# Patient Record
Sex: FEMALE | Race: WHITE | NOT HISPANIC OR LATINO | Employment: OTHER | URBAN - METROPOLITAN AREA
[De-identification: names, ages, dates, MRNs, and addresses within clinical notes are randomized per-mention and may not be internally consistent; named-entity substitution may affect disease eponyms.]

---

## 2023-06-30 ENCOUNTER — OFFICE VISIT (OUTPATIENT)
Dept: INTERNAL MEDICINE | Facility: CLINIC | Age: 33
End: 2023-06-30
Payer: COMMERCIAL

## 2023-06-30 VITALS
TEMPERATURE: 98 F | SYSTOLIC BLOOD PRESSURE: 120 MMHG | DIASTOLIC BLOOD PRESSURE: 70 MMHG | BODY MASS INDEX: 36.49 KG/M2 | OXYGEN SATURATION: 98 % | WEIGHT: 227.06 LBS | HEIGHT: 66 IN | HEART RATE: 100 BPM

## 2023-06-30 DIAGNOSIS — Z00.00 WELLNESS EXAMINATION: Primary | ICD-10-CM

## 2023-06-30 DIAGNOSIS — A63.0 HPV (HUMAN PAPILLOMA VIRUS) ANOGENITAL INFECTION: ICD-10-CM

## 2023-06-30 PROCEDURE — 3008F PR BODY MASS INDEX (BMI) DOCUMENTED: ICD-10-PCS | Mod: CPTII,S$GLB,, | Performed by: FAMILY MEDICINE

## 2023-06-30 PROCEDURE — 99999 PR PBB SHADOW E&M-EST. PATIENT-LVL III: ICD-10-PCS | Mod: PBBFAC,,, | Performed by: FAMILY MEDICINE

## 2023-06-30 PROCEDURE — 3078F DIAST BP <80 MM HG: CPT | Mod: CPTII,S$GLB,, | Performed by: FAMILY MEDICINE

## 2023-06-30 PROCEDURE — 3074F SYST BP LT 130 MM HG: CPT | Mod: CPTII,S$GLB,, | Performed by: FAMILY MEDICINE

## 2023-06-30 PROCEDURE — 1160F PR REVIEW ALL MEDS BY PRESCRIBER/CLIN PHARMACIST DOCUMENTED: ICD-10-PCS | Mod: CPTII,S$GLB,, | Performed by: FAMILY MEDICINE

## 2023-06-30 PROCEDURE — 3008F BODY MASS INDEX DOCD: CPT | Mod: CPTII,S$GLB,, | Performed by: FAMILY MEDICINE

## 2023-06-30 PROCEDURE — 1159F MED LIST DOCD IN RCRD: CPT | Mod: CPTII,S$GLB,, | Performed by: FAMILY MEDICINE

## 2023-06-30 PROCEDURE — 1160F RVW MEDS BY RX/DR IN RCRD: CPT | Mod: CPTII,S$GLB,, | Performed by: FAMILY MEDICINE

## 2023-06-30 PROCEDURE — 1159F PR MEDICATION LIST DOCUMENTED IN MEDICAL RECORD: ICD-10-PCS | Mod: CPTII,S$GLB,, | Performed by: FAMILY MEDICINE

## 2023-06-30 PROCEDURE — 99385 PR PREVENTIVE VISIT,NEW,18-39: ICD-10-PCS | Mod: S$GLB,,, | Performed by: FAMILY MEDICINE

## 2023-06-30 PROCEDURE — 3074F PR MOST RECENT SYSTOLIC BLOOD PRESSURE < 130 MM HG: ICD-10-PCS | Mod: CPTII,S$GLB,, | Performed by: FAMILY MEDICINE

## 2023-06-30 PROCEDURE — 99999 PR PBB SHADOW E&M-EST. PATIENT-LVL III: CPT | Mod: PBBFAC,,, | Performed by: FAMILY MEDICINE

## 2023-06-30 PROCEDURE — 99385 PREV VISIT NEW AGE 18-39: CPT | Mod: S$GLB,,, | Performed by: FAMILY MEDICINE

## 2023-06-30 PROCEDURE — 3078F PR MOST RECENT DIASTOLIC BLOOD PRESSURE < 80 MM HG: ICD-10-PCS | Mod: CPTII,S$GLB,, | Performed by: FAMILY MEDICINE

## 2023-06-30 RX ORDER — SERTRALINE HYDROCHLORIDE 100 MG/1
100 TABLET, FILM COATED ORAL 2 TIMES DAILY
COMMUNITY
Start: 2023-06-21

## 2023-06-30 NOTE — PROGRESS NOTES
Subjective:       Patient ID: Lanie Arce is a 32 y.o. female.    Chief Complaint: Establish Care    Lanie Arce is a 32 y.o. female and is here for a comprehensive physical exam.    Do you take any herbs or supplements that were not prescribed by a doctor? no  Are you taking calcium supplements? no  Are you taking aspirin daily? no     History:  Any STD's in the past? hpv    The following portions of the patient's history were reviewed and updated as appropriate: allergies, current medications, past family history, past medical history, past social history, past surgical history and problem list.    Review of Systems  Do you have pain that bothers you in your daily life? no  Pertinent items are noted in HPI.      2. Patient Counseling:  --Nutrition: Stressed importance of moderation in sodium/caffeine intake, saturated fat and cholesterol, caloric balance.  --Exercise: Stressed the importance of regular exercise.   --Substance Abuse: Discussed cessation/primary prevention of tobacco, alcohol - etoh occ.   --Sexuality: Discussed sexually transmitted disease.  --Injury prevention: Discussed safety belts, smoke detector.   --Dental health: Discussed dental health.  --Immunizations reviewed.    3. Discussed the patient's BMI.  4. Follow up as needed for acute illness        Review of Systems   Constitutional:  Negative for fever.   HENT:  Negative for congestion.    Eyes:  Negative for discharge.   Respiratory:  Negative for shortness of breath.    Cardiovascular:  Negative for chest pain.   Gastrointestinal:  Negative for abdominal pain.   Genitourinary:  Negative for difficulty urinating.   Musculoskeletal:  Negative for joint swelling.   Neurological:  Negative for dizziness.   Psychiatric/Behavioral:  Negative for agitation.      Objective:      Physical Exam  Vitals and nursing note reviewed.   Constitutional:       General: She is not in acute distress.     Appearance: Normal appearance. She is  well-developed. She is not diaphoretic.   HENT:      Head: Normocephalic and atraumatic.   Eyes:      General: No scleral icterus.     Conjunctiva/sclera: Conjunctivae normal.   Cardiovascular:      Rate and Rhythm: Normal rate and regular rhythm.   Pulmonary:      Effort: Pulmonary effort is normal. No respiratory distress.      Breath sounds: Normal breath sounds. No wheezing.   Abdominal:      General: There is no distension.      Palpations: Abdomen is soft.      Tenderness: There is no abdominal tenderness. There is no guarding.   Skin:     General: Skin is warm.      Coloration: Skin is not pale.      Findings: No erythema or rash.      Comments: Good turgor   Neurological:      Mental Status: She is alert.   Psychiatric:         Mood and Affect: Mood normal.         Thought Content: Thought content normal.       Assessment:       1. Wellness examination    2. Mother currently breast-feeding    3. HPV (human papilloma virus) anogenital infection        Plan:     Problem List Items Addressed This Visit          Renal/    Mother currently breast-feeding       ID    HPV (human papilloma virus) anogenital infection       Other    Wellness examination - Primary    Relevant Orders    CBC Auto Differential    Comprehensive Metabolic Panel    Hemoglobin A1C    Ferritin    Iron and TIBC    Hepatitis C Antibody    HIV 1/2 Ag/Ab (4th Gen)    Lipid Panel    TSH    T4, FREE

## 2023-07-03 ENCOUNTER — LAB VISIT (OUTPATIENT)
Dept: LAB | Facility: HOSPITAL | Age: 33
End: 2023-07-03
Attending: FAMILY MEDICINE
Payer: COMMERCIAL

## 2023-07-03 DIAGNOSIS — Z00.00 WELLNESS EXAMINATION: ICD-10-CM

## 2023-07-03 LAB
ALBUMIN SERPL BCP-MCNC: 4 G/DL (ref 3.5–5.2)
ALP SERPL-CCNC: 135 U/L (ref 55–135)
ALT SERPL W/O P-5'-P-CCNC: 26 U/L (ref 10–44)
ANION GAP SERPL CALC-SCNC: 13 MMOL/L (ref 8–16)
AST SERPL-CCNC: 20 U/L (ref 10–40)
BASOPHILS # BLD AUTO: 0.06 K/UL (ref 0–0.2)
BASOPHILS NFR BLD: 0.9 % (ref 0–1.9)
BILIRUB SERPL-MCNC: 0.3 MG/DL (ref 0.1–1)
BUN SERPL-MCNC: 14 MG/DL (ref 6–20)
CALCIUM SERPL-MCNC: 9.9 MG/DL (ref 8.7–10.5)
CHLORIDE SERPL-SCNC: 106 MMOL/L (ref 95–110)
CHOLEST SERPL-MCNC: 229 MG/DL (ref 120–199)
CHOLEST/HDLC SERPL: 3.8 {RATIO} (ref 2–5)
CO2 SERPL-SCNC: 24 MMOL/L (ref 23–29)
CREAT SERPL-MCNC: 0.8 MG/DL (ref 0.5–1.4)
DIFFERENTIAL METHOD: ABNORMAL
EOSINOPHIL # BLD AUTO: 0.4 K/UL (ref 0–0.5)
EOSINOPHIL NFR BLD: 5.5 % (ref 0–8)
ERYTHROCYTE [DISTWIDTH] IN BLOOD BY AUTOMATED COUNT: 15.1 % (ref 11.5–14.5)
EST. GFR  (NO RACE VARIABLE): >60 ML/MIN/1.73 M^2
ESTIMATED AVG GLUCOSE: 108 MG/DL (ref 68–131)
FERRITIN SERPL-MCNC: 84 NG/ML (ref 20–300)
GLUCOSE SERPL-MCNC: 77 MG/DL (ref 70–110)
HBA1C MFR BLD: 5.4 % (ref 4–5.6)
HCT VFR BLD AUTO: 41.1 % (ref 37–48.5)
HCV AB SERPL QL IA: NORMAL
HDLC SERPL-MCNC: 61 MG/DL (ref 40–75)
HDLC SERPL: 26.6 % (ref 20–50)
HGB BLD-MCNC: 12.8 G/DL (ref 12–16)
HIV 1+2 AB+HIV1 P24 AG SERPL QL IA: NORMAL
IMM GRANULOCYTES # BLD AUTO: 0.01 K/UL (ref 0–0.04)
IMM GRANULOCYTES NFR BLD AUTO: 0.2 % (ref 0–0.5)
IRON SERPL-MCNC: 44 UG/DL (ref 30–160)
LDLC SERPL CALC-MCNC: 145.8 MG/DL (ref 63–159)
LYMPHOCYTES # BLD AUTO: 2.1 K/UL (ref 1–4.8)
LYMPHOCYTES NFR BLD: 33.5 % (ref 18–48)
MCH RBC QN AUTO: 26.4 PG (ref 27–31)
MCHC RBC AUTO-ENTMCNC: 31.1 G/DL (ref 32–36)
MCV RBC AUTO: 85 FL (ref 82–98)
MONOCYTES # BLD AUTO: 0.5 K/UL (ref 0.3–1)
MONOCYTES NFR BLD: 7.4 % (ref 4–15)
NEUTROPHILS # BLD AUTO: 3.3 K/UL (ref 1.8–7.7)
NEUTROPHILS NFR BLD: 52.5 % (ref 38–73)
NONHDLC SERPL-MCNC: 168 MG/DL
NRBC BLD-RTO: 0 /100 WBC
PLATELET # BLD AUTO: 253 K/UL (ref 150–450)
PMV BLD AUTO: 12.1 FL (ref 9.2–12.9)
POTASSIUM SERPL-SCNC: 5 MMOL/L (ref 3.5–5.1)
PROT SERPL-MCNC: 7.9 G/DL (ref 6–8.4)
RBC # BLD AUTO: 4.85 M/UL (ref 4–5.4)
SATURATED IRON: 11 % (ref 20–50)
SODIUM SERPL-SCNC: 143 MMOL/L (ref 136–145)
T4 FREE SERPL-MCNC: 0.81 NG/DL (ref 0.71–1.51)
TOTAL IRON BINDING CAPACITY: 395 UG/DL (ref 250–450)
TRANSFERRIN SERPL-MCNC: 267 MG/DL (ref 200–375)
TRIGL SERPL-MCNC: 111 MG/DL (ref 30–150)
TSH SERPL DL<=0.005 MIU/L-ACNC: 0.66 UIU/ML (ref 0.4–4)
WBC # BLD AUTO: 6.36 K/UL (ref 3.9–12.7)

## 2023-07-03 PROCEDURE — 36415 COLL VENOUS BLD VENIPUNCTURE: CPT | Mod: PO | Performed by: FAMILY MEDICINE

## 2023-07-03 PROCEDURE — 82728 ASSAY OF FERRITIN: CPT | Performed by: FAMILY MEDICINE

## 2023-07-03 PROCEDURE — 86803 HEPATITIS C AB TEST: CPT | Performed by: FAMILY MEDICINE

## 2023-07-03 PROCEDURE — 80061 LIPID PANEL: CPT | Performed by: FAMILY MEDICINE

## 2023-07-03 PROCEDURE — 80053 COMPREHEN METABOLIC PANEL: CPT | Performed by: FAMILY MEDICINE

## 2023-07-03 PROCEDURE — 85025 COMPLETE CBC W/AUTO DIFF WBC: CPT | Performed by: FAMILY MEDICINE

## 2023-07-03 PROCEDURE — 87389 HIV-1 AG W/HIV-1&-2 AB AG IA: CPT | Performed by: FAMILY MEDICINE

## 2023-07-03 PROCEDURE — 83036 HEMOGLOBIN GLYCOSYLATED A1C: CPT | Performed by: FAMILY MEDICINE

## 2023-07-03 PROCEDURE — 84443 ASSAY THYROID STIM HORMONE: CPT | Performed by: FAMILY MEDICINE

## 2023-07-03 PROCEDURE — 84439 ASSAY OF FREE THYROXINE: CPT | Performed by: FAMILY MEDICINE

## 2023-07-03 PROCEDURE — 84466 ASSAY OF TRANSFERRIN: CPT | Performed by: FAMILY MEDICINE

## 2023-07-05 ENCOUNTER — PATIENT MESSAGE (OUTPATIENT)
Dept: INTERNAL MEDICINE | Facility: CLINIC | Age: 33
End: 2023-07-05
Payer: COMMERCIAL

## 2023-07-06 ENCOUNTER — OFFICE VISIT (OUTPATIENT)
Dept: INTERNAL MEDICINE | Facility: CLINIC | Age: 33
End: 2023-07-06
Payer: COMMERCIAL

## 2023-07-06 VITALS
SYSTOLIC BLOOD PRESSURE: 130 MMHG | TEMPERATURE: 98 F | DIASTOLIC BLOOD PRESSURE: 74 MMHG | OXYGEN SATURATION: 95 % | BODY MASS INDEX: 36.74 KG/M2 | HEIGHT: 66 IN | WEIGHT: 228.63 LBS | HEART RATE: 104 BPM

## 2023-07-06 DIAGNOSIS — H66.92 ACUTE OTITIS MEDIA, LEFT: Primary | ICD-10-CM

## 2023-07-06 PROCEDURE — 99999 PR PBB SHADOW E&M-EST. PATIENT-LVL IV: CPT | Mod: PBBFAC,,, | Performed by: NURSE PRACTITIONER

## 2023-07-06 PROCEDURE — 3078F PR MOST RECENT DIASTOLIC BLOOD PRESSURE < 80 MM HG: ICD-10-PCS | Mod: CPTII,S$GLB,, | Performed by: NURSE PRACTITIONER

## 2023-07-06 PROCEDURE — 3075F PR MOST RECENT SYSTOLIC BLOOD PRESS GE 130-139MM HG: ICD-10-PCS | Mod: CPTII,S$GLB,, | Performed by: NURSE PRACTITIONER

## 2023-07-06 PROCEDURE — 1159F PR MEDICATION LIST DOCUMENTED IN MEDICAL RECORD: ICD-10-PCS | Mod: CPTII,S$GLB,, | Performed by: NURSE PRACTITIONER

## 2023-07-06 PROCEDURE — 99214 OFFICE O/P EST MOD 30 MIN: CPT | Mod: S$GLB,,, | Performed by: NURSE PRACTITIONER

## 2023-07-06 PROCEDURE — 3008F PR BODY MASS INDEX (BMI) DOCUMENTED: ICD-10-PCS | Mod: CPTII,S$GLB,, | Performed by: NURSE PRACTITIONER

## 2023-07-06 PROCEDURE — 99999 PR PBB SHADOW E&M-EST. PATIENT-LVL IV: ICD-10-PCS | Mod: PBBFAC,,, | Performed by: NURSE PRACTITIONER

## 2023-07-06 PROCEDURE — 99214 PR OFFICE/OUTPT VISIT, EST, LEVL IV, 30-39 MIN: ICD-10-PCS | Mod: S$GLB,,, | Performed by: NURSE PRACTITIONER

## 2023-07-06 PROCEDURE — 3075F SYST BP GE 130 - 139MM HG: CPT | Mod: CPTII,S$GLB,, | Performed by: NURSE PRACTITIONER

## 2023-07-06 PROCEDURE — 3044F PR MOST RECENT HEMOGLOBIN A1C LEVEL <7.0%: ICD-10-PCS | Mod: CPTII,S$GLB,, | Performed by: NURSE PRACTITIONER

## 2023-07-06 PROCEDURE — 1160F RVW MEDS BY RX/DR IN RCRD: CPT | Mod: CPTII,S$GLB,, | Performed by: NURSE PRACTITIONER

## 2023-07-06 PROCEDURE — 1160F PR REVIEW ALL MEDS BY PRESCRIBER/CLIN PHARMACIST DOCUMENTED: ICD-10-PCS | Mod: CPTII,S$GLB,, | Performed by: NURSE PRACTITIONER

## 2023-07-06 PROCEDURE — 3078F DIAST BP <80 MM HG: CPT | Mod: CPTII,S$GLB,, | Performed by: NURSE PRACTITIONER

## 2023-07-06 PROCEDURE — 1159F MED LIST DOCD IN RCRD: CPT | Mod: CPTII,S$GLB,, | Performed by: NURSE PRACTITIONER

## 2023-07-06 PROCEDURE — 3044F HG A1C LEVEL LT 7.0%: CPT | Mod: CPTII,S$GLB,, | Performed by: NURSE PRACTITIONER

## 2023-07-06 PROCEDURE — 3008F BODY MASS INDEX DOCD: CPT | Mod: CPTII,S$GLB,, | Performed by: NURSE PRACTITIONER

## 2023-07-06 RX ORDER — AMOXICILLIN 875 MG/1
875 TABLET, FILM COATED ORAL EVERY 12 HOURS
Qty: 20 TABLET | Refills: 0 | Status: SHIPPED | OUTPATIENT
Start: 2023-07-06 | End: 2023-07-16

## 2023-07-06 RX ORDER — FLUTICASONE PROPIONATE 50 MCG
1 SPRAY, SUSPENSION (ML) NASAL DAILY
Qty: 18.2 ML | Refills: 0 | Status: SHIPPED | OUTPATIENT
Start: 2023-07-06 | End: 2023-08-02

## 2023-07-06 NOTE — PROGRESS NOTES
"Subjective:       Patient ID: Laine Arce is a 32 y.o. female.    Chief Complaint: Otalgia (Started last night/No pain )    Pt presents to clinic today for left ear pain  Started last night with some ear pain  Congestion, sinus pressure  No fever  Child with like illness        /74   Pulse 104   Temp 97.7 °F (36.5 °C) (Tympanic)   Ht 5' 6" (1.676 m)   Wt 103.7 kg (228 lb 9.9 oz)   LMP  (LMP Unknown)   SpO2 95%   BMI 36.90 kg/m²     Review of Systems   Constitutional:  Negative for activity change, appetite change, chills, diaphoresis, fatigue, fever and unexpected weight change.   HENT:  Positive for ear pain, rhinorrhea, sinus pressure and sinus pain. Negative for trouble swallowing.    Respiratory:  Negative for cough and shortness of breath.    Cardiovascular:  Negative for chest pain, palpitations and leg swelling.   Gastrointestinal: Negative.    Genitourinary: Negative.    Musculoskeletal: Negative.    Skin:  Negative for color change, pallor, rash and wound.   Allergic/Immunologic: Negative for immunocompromised state.   Neurological: Negative.  Negative for dizziness and facial asymmetry.   Hematological:  Negative for adenopathy. Does not bruise/bleed easily.   Psychiatric/Behavioral:  Negative for agitation, behavioral problems and confusion.      Objective:      Physical Exam  Vitals and nursing note reviewed.   Constitutional:       Appearance: She is well-developed. She is not diaphoretic.   HENT:      Head: Normocephalic and atraumatic.      Right Ear: Tympanic membrane, ear canal and external ear normal.      Left Ear: Ear canal and external ear normal. Tympanic membrane is erythematous and bulging.      Nose: Mucosal edema and rhinorrhea present.      Right Sinus: No maxillary sinus tenderness or frontal sinus tenderness.      Left Sinus: No maxillary sinus tenderness or frontal sinus tenderness.      Mouth/Throat:      Pharynx: Uvula midline. No oropharyngeal exudate or posterior " oropharyngeal erythema.   Eyes:      General:         Right eye: No discharge.         Left eye: No discharge.      Conjunctiva/sclera: Conjunctivae normal.   Cardiovascular:      Rate and Rhythm: Normal rate and regular rhythm.      Heart sounds: Normal heart sounds. No murmur heard.  Pulmonary:      Effort: Pulmonary effort is normal. No accessory muscle usage or respiratory distress.      Breath sounds: Normal breath sounds. No decreased breath sounds, wheezing, rhonchi or rales.   Chest:      Chest wall: No tenderness.   Abdominal:      General: There is no distension.      Palpations: Abdomen is soft.   Musculoskeletal:         General: Normal range of motion.      Cervical back: Normal range of motion.   Skin:     General: Skin is warm and dry.   Neurological:      Mental Status: She is alert and oriented to person, place, and time.   Psychiatric:         Behavior: Behavior normal.       Assessment:       1. Acute otitis media, left    2. BMI 36.0-36.9,adult        Plan:       Lanie was seen today for otalgia.    Diagnoses and all orders for this visit:    Acute otitis media, left  -     amoxicillin (AMOXIL) 875 MG tablet; Take 1 tablet (875 mg total) by mouth every 12 (twelve) hours. for 10 days    BMI 36.0-36.9,adult    Other orders  -     fluticasone propionate (FLONASE) 50 mcg/actuation nasal spray; 1 spray (50 mcg total) by Each Nostril route once daily.      Complete all meds as prescribed  Keep ear clean and dry  Follow up for worsening or no improvement in symptoms and PRN.

## 2023-08-02 RX ORDER — FLUTICASONE PROPIONATE 50 MCG
SPRAY, SUSPENSION (ML) NASAL
Qty: 16 ML | Refills: 0 | Status: SHIPPED | OUTPATIENT
Start: 2023-08-02

## 2023-10-02 PROBLEM — Z00.00 WELLNESS EXAMINATION: Status: RESOLVED | Noted: 2023-06-30 | Resolved: 2023-10-02

## 2023-12-04 ENCOUNTER — OFFICE VISIT (OUTPATIENT)
Dept: INTERNAL MEDICINE | Facility: CLINIC | Age: 33
End: 2023-12-04
Payer: COMMERCIAL

## 2023-12-04 VITALS
BODY MASS INDEX: 37.14 KG/M2 | HEIGHT: 66 IN | OXYGEN SATURATION: 96 % | WEIGHT: 231.06 LBS | HEART RATE: 99 BPM | DIASTOLIC BLOOD PRESSURE: 70 MMHG | TEMPERATURE: 97 F | SYSTOLIC BLOOD PRESSURE: 114 MMHG

## 2023-12-04 DIAGNOSIS — R63.5 WEIGHT GAIN: Primary | ICD-10-CM

## 2023-12-04 DIAGNOSIS — E66.9 CLASS 2 OBESITY WITH BODY MASS INDEX (BMI) OF 37.0 TO 37.9 IN ADULT, UNSPECIFIED OBESITY TYPE, UNSPECIFIED WHETHER SERIOUS COMORBIDITY PRESENT: ICD-10-CM

## 2023-12-04 PROCEDURE — 3044F HG A1C LEVEL LT 7.0%: CPT | Mod: CPTII,S$GLB,, | Performed by: NURSE PRACTITIONER

## 2023-12-04 PROCEDURE — 99999 PR PBB SHADOW E&M-EST. PATIENT-LVL III: CPT | Mod: PBBFAC,,, | Performed by: NURSE PRACTITIONER

## 2023-12-04 PROCEDURE — 3078F DIAST BP <80 MM HG: CPT | Mod: CPTII,S$GLB,, | Performed by: NURSE PRACTITIONER

## 2023-12-04 PROCEDURE — 99999 PR PBB SHADOW E&M-EST. PATIENT-LVL III: ICD-10-PCS | Mod: PBBFAC,,, | Performed by: NURSE PRACTITIONER

## 2023-12-04 PROCEDURE — 1160F RVW MEDS BY RX/DR IN RCRD: CPT | Mod: CPTII,S$GLB,, | Performed by: NURSE PRACTITIONER

## 2023-12-04 PROCEDURE — 99213 PR OFFICE/OUTPT VISIT, EST, LEVL III, 20-29 MIN: ICD-10-PCS | Mod: S$GLB,,, | Performed by: NURSE PRACTITIONER

## 2023-12-04 PROCEDURE — 1159F MED LIST DOCD IN RCRD: CPT | Mod: CPTII,S$GLB,, | Performed by: NURSE PRACTITIONER

## 2023-12-04 PROCEDURE — 1160F PR REVIEW ALL MEDS BY PRESCRIBER/CLIN PHARMACIST DOCUMENTED: ICD-10-PCS | Mod: CPTII,S$GLB,, | Performed by: NURSE PRACTITIONER

## 2023-12-04 PROCEDURE — 3008F PR BODY MASS INDEX (BMI) DOCUMENTED: ICD-10-PCS | Mod: CPTII,S$GLB,, | Performed by: NURSE PRACTITIONER

## 2023-12-04 PROCEDURE — 99213 OFFICE O/P EST LOW 20 MIN: CPT | Mod: S$GLB,,, | Performed by: NURSE PRACTITIONER

## 2023-12-04 PROCEDURE — 1159F PR MEDICATION LIST DOCUMENTED IN MEDICAL RECORD: ICD-10-PCS | Mod: CPTII,S$GLB,, | Performed by: NURSE PRACTITIONER

## 2023-12-04 PROCEDURE — 3074F PR MOST RECENT SYSTOLIC BLOOD PRESSURE < 130 MM HG: ICD-10-PCS | Mod: CPTII,S$GLB,, | Performed by: NURSE PRACTITIONER

## 2023-12-04 PROCEDURE — 3074F SYST BP LT 130 MM HG: CPT | Mod: CPTII,S$GLB,, | Performed by: NURSE PRACTITIONER

## 2023-12-04 PROCEDURE — 3078F PR MOST RECENT DIASTOLIC BLOOD PRESSURE < 80 MM HG: ICD-10-PCS | Mod: CPTII,S$GLB,, | Performed by: NURSE PRACTITIONER

## 2023-12-04 PROCEDURE — 3044F PR MOST RECENT HEMOGLOBIN A1C LEVEL <7.0%: ICD-10-PCS | Mod: CPTII,S$GLB,, | Performed by: NURSE PRACTITIONER

## 2023-12-04 PROCEDURE — 3008F BODY MASS INDEX DOCD: CPT | Mod: CPTII,S$GLB,, | Performed by: NURSE PRACTITIONER

## 2023-12-04 NOTE — PROGRESS NOTES
"Subjective:       Patient ID: Lanie Arce is a 32 y.o. female.    Chief Complaint: Obesity    Pt presents to clinic today for concerns over her weight   Feels like she is struggling to lose the weight after her 4 kids  She is currently weaning from breast feeding   Highest adult weight- 230 lbs  Lowest adult weight- 185 lbs  Goal to be 185 lbs    She has been trying to get in some exercise 3-4 times per week  Admits her eating habits are not quite where they should be   Breakfast- egg sandwich  Lunch- sandwich, leftovers  Dinners- pastas, rice/gravy   Drinks lots of water         /70   Pulse 99   Temp 97 °F (36.1 °C) (Tympanic)   Ht 5' 6" (1.676 m)   Wt 104.8 kg (231 lb 0.7 oz)   LMP 11/27/2023 (Approximate)   SpO2 96%   BMI 37.29 kg/m²     Review of Systems   Constitutional:  Positive for unexpected weight change. Negative for activity change, appetite change, chills, diaphoresis, fatigue and fever.   HENT: Negative.     Respiratory:  Negative for cough and shortness of breath.    Cardiovascular:  Negative for chest pain, palpitations and leg swelling.   Gastrointestinal: Negative.    Genitourinary: Negative.    Musculoskeletal: Negative.    Skin:  Negative for color change, pallor, rash and wound.   Allergic/Immunologic: Negative for immunocompromised state.   Neurological: Negative.  Negative for dizziness and facial asymmetry.   Hematological:  Negative for adenopathy. Does not bruise/bleed easily.   Psychiatric/Behavioral:  Negative for agitation, behavioral problems and confusion.        Objective:      Physical Exam  Vitals and nursing note reviewed.   Constitutional:       General: She is not in acute distress.     Appearance: Normal appearance. She is well-developed. She is obese. She is not diaphoretic.   HENT:      Head: Normocephalic and atraumatic.   Cardiovascular:      Rate and Rhythm: Normal rate and regular rhythm.      Heart sounds: Normal heart sounds. No murmur heard.  Pulmonary:    "   Effort: Pulmonary effort is normal. No respiratory distress.      Breath sounds: Normal breath sounds.   Musculoskeletal:         General: Normal range of motion.   Skin:     General: Skin is warm and dry.      Findings: No rash.   Neurological:      Mental Status: She is alert.   Psychiatric:         Mood and Affect: Mood normal.         Behavior: Behavior normal. Behavior is cooperative.         Thought Content: Thought content normal.         Judgment: Judgment normal.         Assessment:       1. Weight gain    2. Class 2 obesity with body mass index (BMI) of 37.0 to 37.9 in adult, unspecified obesity type, unspecified whether serious comorbidity present        Plan:       Lanie was seen today for obesity.    Diagnoses and all orders for this visit:    Weight gain    Class 2 obesity with body mass index (BMI) of 37.0 to 37.9 in adult, unspecified obesity type, unspecified whether serious comorbidity present      We discussed her 24 food diary recall  Showed her some ways to make improvements in her diet  Focus on protein- aim for about  grams protein/day  Decrease carbs   Drink lots of water  We did discuss glp1 meds however with insurance being an issue- this is likely not an option  Follow up for worsening or no improvement in symptoms and PRN.

## 2024-02-28 ENCOUNTER — PATIENT MESSAGE (OUTPATIENT)
Dept: URGENT CARE | Facility: CLINIC | Age: 34
End: 2024-02-28
Payer: COMMERCIAL

## 2024-04-01 ENCOUNTER — NURSE TRIAGE (OUTPATIENT)
Dept: ADMINISTRATIVE | Facility: CLINIC | Age: 34
End: 2024-04-01
Payer: COMMERCIAL

## 2024-04-01 NOTE — TELEPHONE ENCOUNTER
OOC Rn  Patient calling about stepping on a justine nail.   It is pounding.   Left foot on the ball. Denies bleeding.  Last tetnanus,   got Dtap during last preg in 2022.    Patient boiled perioxde and washed wound.   Wound still look dirty, black,   size of nail. Care advise is to go to ED/UC now, for any new or worsening symptoms to callback.  Number given and paitent VU.   Reason for Disposition   Dirt in the wound and not removed with 15 minutes of scrubbing    Additional Information   Negative: Shock suspected (e.g., cold/pale/clammy skin, too weak to stand, low BP, rapid pulse)   Negative: Puncture wound of head, neck, chest, back, or abdomen that sounds life-threatening to triager   Negative: Sounds like a life-threatening emergency to the triager   Negative: Puncture wound of head, neck, chest, abdomen, or overlying a joint and it could be deep   Negative: Needlestick from used or discarded injection needle  (Exception: Clean, unused needle.)   Negative: High pressure injection injury (e.g., from grease gun or paint gun, usually work-related)    Protocols used: Puncture Wound-A-OH

## 2024-05-14 ENCOUNTER — NURSE TRIAGE (OUTPATIENT)
Dept: ADMINISTRATIVE | Facility: CLINIC | Age: 34
End: 2024-05-14
Payer: COMMERCIAL

## 2024-05-14 NOTE — TELEPHONE ENCOUNTER
Lanie reports dog bite on May 5th. Seen by  provider on 5/5/24. Did not complete antibiotic that was prescribed, only took med for about 2 days. Pt reports tender knot beneath left forearm puncture wounds noticed 2 days ago. Advised pt per triage protocol to see in office today. Appt scheduled via VV for 1215 today per pt request. V/u.       Reason for Disposition   Patient wants to be seen    Additional Information   Negative: Major bleeding (actively dripping or spurting) that can't be stopped   Negative: Sounds like a life-threatening emergency to the triager   Negative: Any break in skin from BITE (e.g., cut, puncture, or scratch) and WILD animal at risk for RABIES (e.g., bat, raccoon, baez, skunk, coyote, other carnivores; see Background for list)   Negative: Any break in skin from BITE (e.g., cut, puncture, or scratch) and PET animal (e.g., dog, cat, or ferret) at risk for RABIES (e.g., sick, stray, unprovoked bite, developing country)   Negative: Any break in skin from BITE (e.g., cut, puncture, or scratch) and monkey   Negative: Cut (length > 1/8 inch or 3 mm) or skin tear and any animal  (Exception: Superficial scratch that doesn't go through dermis.)   Negative: Bleeding won't stop after 10 minutes of direct pressure (using correct technique)   Negative: EXPOSURE of non-intact skin (e.g., exposed person has dermatitis, abrasion, wound)  with animal BODY FLUID (e.g., saliva such as licking, blood, brain) and animal at high-risk for RABIES (e.g., bat, raccoon, baez, skunk, coyote, other carnivores)   Negative: Sounds like a serious bite injury to the triager   Negative: SEVERE pain (e.g., excruciating)   Negative: Puncture wound (hole through the skin) from cat (teeth or claws)   Negative: Puncture wound or small cut on face  (Exception: Puncture from small pet, such as a gerbil, mouse, hamster, puppy; or shallow scratches.)   Negative: Puncture wound or small cut on hands or genitals  (Exception: Puncture  from small pet, such as a gerbil, mouse, hamster, puppy; or shallow scratches.)   Negative: Puncture wound and weak immune system (e.g., HIV positive, cancer chemo, splenectomy, organ transplant, chronic steroids)   Negative: Looks infected (spreading redness, red streak, or pus)   Negative: No bite robert and suspected bat exposure (e.g., bat found in same room as sleeping adult)   Negative: No prior tetanus shots (or is not fully vaccinated) and any wound (e.g., cut, scrape)   Negative: Last tetanus shot > 5 years and any wound (e.g., cut, scrape)    Protocols used: Animal Bite-A-OH

## 2024-11-13 ENCOUNTER — HOSPITAL ENCOUNTER (OUTPATIENT)
Dept: RADIOLOGY | Facility: HOSPITAL | Age: 34
Discharge: HOME | End: 2024-11-13
Payer: COMMERCIAL

## 2024-11-13 ENCOUNTER — OFFICE VISIT (OUTPATIENT)
Dept: INTERNAL MEDICINE | Facility: CLINIC | Age: 34
End: 2024-11-13
Payer: COMMERCIAL

## 2024-11-13 ENCOUNTER — PATIENT MESSAGE (OUTPATIENT)
Dept: INTERNAL MEDICINE | Facility: CLINIC | Age: 34
End: 2024-11-13

## 2024-11-13 VITALS
SYSTOLIC BLOOD PRESSURE: 116 MMHG | DIASTOLIC BLOOD PRESSURE: 76 MMHG | RESPIRATION RATE: 15 BRPM | WEIGHT: 179 LBS | TEMPERATURE: 96 F | BODY MASS INDEX: 28.77 KG/M2 | HEART RATE: 95 BPM | HEIGHT: 66 IN | OXYGEN SATURATION: 95 %

## 2024-11-13 DIAGNOSIS — Z01.818 PRE-OP EXAM: Primary | ICD-10-CM

## 2024-11-13 DIAGNOSIS — Z01.818 PRE-OP EXAM: ICD-10-CM

## 2024-11-13 DIAGNOSIS — D68.61 ANTIPHOSPHOLIPID SYNDROME: ICD-10-CM

## 2024-11-13 LAB
OHS QRS DURATION: 80 MS
OHS QTC CALCULATION: 424 MS

## 2024-11-13 PROCEDURE — 71046 X-RAY EXAM CHEST 2 VIEWS: CPT | Mod: 26,,, | Performed by: RADIOLOGY

## 2024-11-13 PROCEDURE — 99999 PR PBB SHADOW E&M-EST. PATIENT-LVL IV: CPT | Mod: PBBFAC,,,

## 2024-11-13 PROCEDURE — 71046 X-RAY EXAM CHEST 2 VIEWS: CPT | Mod: TC,FY,PO

## 2024-11-13 PROCEDURE — 93010 ELECTROCARDIOGRAM REPORT: CPT | Mod: S$GLB,,, | Performed by: INTERNAL MEDICINE

## 2024-11-13 RX ORDER — SERTRALINE HYDROCHLORIDE 200 MG/1
200 CAPSULE ORAL DAILY
COMMUNITY

## 2024-11-13 RX ORDER — FAMOTIDINE 20 MG/1
20 TABLET, FILM COATED ORAL 2 TIMES DAILY PRN
COMMUNITY
Start: 2024-07-25 | End: 2025-07-25

## 2024-11-13 RX ORDER — TIRZEPATIDE 7.5 MG/.5ML
6.5 INJECTION, SOLUTION SUBCUTANEOUS
COMMUNITY
Start: 2024-11-08 | End: 2024-12-08

## 2024-11-13 NOTE — PROGRESS NOTES
Pre  Subjective:     Lanie Arce is a 33 y.o. female who presents to the office today for a preoperative consultation at the request of surgeon Dr. Lerma who plans on performing left SI fusion on December 6. This consultation is requested for the specific conditions prompting preoperative evaluation (i.e. because of potential affect on operative risk). Planned anesthesia: general. The patient has the following known anesthesia issues:  none . Patients bleeding risk: no recent abnormal bleeding, no remote history of abnormal bleeding, and no use of Ca-channel blockers. Patient does not have objections to receiving blood products if needed.    HPI:  Patient is scheduled for a left SI fusion surgery with Dr. Lerma at [facility]. She previously underwent an L5-S1 fusion, which provided effective pain relief despite a challenging recovery. Patient reports a fall down stairs following her previous back surgery, potentially contributing to her current SI joint issues. She expresses optimism about the upcoming surgery, anticipating improvement in her symptoms.    Patient discusses a diagnosis of antiphospholipid syndrome, discovered during a life insurance application process. She reports a past evaluation by an immunologist but is unable to recall specific details. She expresses uncertainty regarding the need for retesting or treatment and reports generalized aches, unsure if they are related to this condition or within normal limits. Patient denies complications during childbirth, has four children and has had four deliveries (P4).      Patient also denies chest pain, palpitations, shortness of breath, swelling, severe headaches, visual disturbances, and constipation, diarrhea, or urination difficulties.        The following portions of the patient's history were reviewed and updated as appropriate:  Past Medical History:   Diagnosis Date    Back pain with history of spinal surgery     Depression     GERD (gastroesophageal  reflux disease)     Heart murmur     Pars defect of lumbar spine     Thyroid disease      Past Medical History:   Diagnosis Date    Back pain with history of spinal surgery     Depression     GERD (gastroesophageal reflux disease)     Heart murmur     Pars defect of lumbar spine     Thyroid disease      Family History   Problem Relation Name Age of Onset    Hyperlipidemia Mother      Diabetes Mother      Heart disease Father      Cancer Maternal Grandfather          colon cancer    Cancer Paternal Grandfather          esophgeal     History reviewed. No pertinent surgical history.  Social History     Socioeconomic History    Marital status:      Spouse name: Eugenio    Number of children: 4   Tobacco Use    Smoking status: Never     Passive exposure: Never    Smokeless tobacco: Never   Substance and Sexual Activity    Alcohol use: Yes    Drug use: Never    Sexual activity: Yes     Partners: Male     Review of patient's allergies indicates:  No Known Allergies  Medication List with Changes/Refills   Current Medications    FAMOTIDINE (PEPCID) 20 MG TABLET    Take 20 mg by mouth 2 (two) times daily as needed for Heartburn.    SERTRALINE 200 MG CAP    Take 200 mg by mouth once daily.    ZEPBOUND 7.5 MG/0.5 ML PNIJ    Inject 6.5 mg into the skin every 7 days.   Discontinued Medications    FLUTICASONE PROPIONATE (FLONASE) 50 MCG/ACTUATION NASAL SPRAY    USE 1 SPRAY (50 MCG TOTAL) IN EACH NOSTRIL ONCE DAILY    SERTRALINE (ZOLOFT) 100 MG TABLET    Take 100 mg by mouth 2 (two) times daily.     Patient Active Problem List   Diagnosis    Mother currently breast-feeding    HPV (human papilloma virus) anogenital infection       Review of Systems  Review of Systems   Constitutional:  Negative for chills and fever.   Eyes:  Negative for visual disturbance.   Respiratory:  Negative for chest tightness and shortness of breath.    Cardiovascular:  Negative for chest pain, palpitations and leg swelling.   Gastrointestinal:   Negative for constipation, diarrhea, nausea and vomiting.   Genitourinary:  Negative for difficulty urinating.   Musculoskeletal:  Positive for arthralgias and back pain.   Neurological:  Negative for headaches.   All other systems reviewed and are negative.       Objective:     Vitals:    11/13/24 0809   BP: 116/76   Pulse: 95   Resp: 15   Temp: 96.2 °F (35.7 °C)       Physical Exam  Constitutional:       General: She is not in acute distress.     Appearance: Normal appearance. She is not ill-appearing or toxic-appearing.   HENT:      Head: Normocephalic and atraumatic.      Right Ear: Tympanic membrane, ear canal and external ear normal.      Left Ear: Tympanic membrane, ear canal and external ear normal.      Nose: Nose normal.      Mouth/Throat:      Mouth: Mucous membranes are moist.      Pharynx: Oropharynx is clear.   Eyes:      Extraocular Movements: Extraocular movements intact.      Pupils: Pupils are equal, round, and reactive to light.   Cardiovascular:      Rate and Rhythm: Normal rate and regular rhythm.      Pulses:           Radial pulses are 2+ on the right side and 2+ on the left side.   Pulmonary:      Effort: Pulmonary effort is normal.      Breath sounds: Normal breath sounds.   Abdominal:      General: Bowel sounds are normal.      Palpations: Abdomen is soft.   Musculoskeletal:      Cervical back: Normal range of motion and neck supple.      Left hip: Bony tenderness present. Decreased range of motion.   Skin:     General: Skin is warm and dry.   Neurological:      General: No focal deficit present.      Mental Status: She is alert and oriented to person, place, and time.   Psychiatric:         Mood and Affect: Mood normal.         Behavior: Behavior normal.         Thought Content: Thought content normal.         Judgment: Judgment normal.          Assessment:       Encounter Diagnoses   Name Primary?    Pre-op exam Yes    Antiphospholipid syndrome         Plan:     Pre-op exam  -     CBC W/  AUTO DIFFERENTIAL; Future; Expected date: 11/13/2024  -     COMPREHENSIVE METABOLIC PANEL; Future; Expected date: 11/13/2024  -     APTT; Future; Expected date: 11/13/2024  -     PROTIME-INR; Future; Expected date: 11/13/2024  -     X-Ray Chest PA And Lateral; Future; Expected date: 11/13/2024  -     IN OFFICE EKG 12-LEAD (to Muse)    Antiphospholipid syndrome  -     Ambulatory referral/consult to Hematology / Oncology; Future; Expected date: 11/20/2024        I reviewed the patient's past medical, surgical, social and family history and with  physical exam findings and the proposed surgery and I make the following recommendations:     From a cardiac standpoint the patient is low risk for surgery with a low risk surgery. Patient has no evidence of cardiac symptomatology or cardiac diagnoses. The patient may proceed with surgery without further cardiac workup.     From a pulmonary standpoint the patient presents as a good candidate as well. The patient has no history of lung disease or pulmonary symptoms. Good pulmonary toilet, incentive spirometry, early ambulation are all recommended to improve the pulmonary outcome. No further pulmonary workup as noted prior to surgery.     DVT prophylaxis should be per standard. Venous compression devices are recommended. Early ambulation. Patient has been educated on signs and symptoms of both DVT and pulmonary embolus and instructed on what to do if there are symptoms postop.     The patient has been instructed to take blood pressure medication the morning of surgery with a sip of water. Avoid any aspirin or anti-inflammatories between now and surgery.     If there is any further I can do to assist in the care of this patient please not hesitate to contact me. I will forward the lab results upon my receipt.    Imani Evans, DORIS    11/13/2024 @ 1550-After reviewing pre-op las, CXR, EKG and physical exam findings, this pt is cleared for surgery.

## 2024-11-15 ENCOUNTER — E-VISIT (OUTPATIENT)
Dept: FAMILY MEDICINE | Facility: CLINIC | Age: 34
End: 2024-11-15
Payer: COMMERCIAL

## 2024-11-15 DIAGNOSIS — M54.2 NECK PAIN: Primary | ICD-10-CM

## 2024-11-15 DIAGNOSIS — M25.519 SHOULDER PAIN, UNSPECIFIED CHRONICITY, UNSPECIFIED LATERALITY: ICD-10-CM

## 2024-11-15 RX ORDER — METHYLPREDNISOLONE 4 MG/1
TABLET ORAL
Qty: 21 TABLET | Refills: 0 | Status: SHIPPED | OUTPATIENT
Start: 2024-11-15

## 2024-11-15 RX ORDER — TIZANIDINE 2 MG/1
2 TABLET ORAL EVERY 8 HOURS PRN
Qty: 60 TABLET | Refills: 1 | Status: SHIPPED | OUTPATIENT
Start: 2024-11-15 | End: 2024-12-15

## 2024-11-15 RX ORDER — MELOXICAM 15 MG/1
15 TABLET ORAL DAILY PRN
Qty: 30 TABLET | Refills: 1 | Status: SHIPPED | OUTPATIENT
Start: 2024-11-15 | End: 2024-12-15

## 2024-11-15 NOTE — PROGRESS NOTES
Patient ID: Lanie Arce is a 33 y.o. female.    Chief Complaint: General Illness (Entered automatically based on patient selection in [employer].)          274}  The patient initiated a request through [employer] on 11/15/2024 for evaluation and management with a chief complaint of General Illness (Entered automatically based on patient selection in [employer].)     I evaluated the questionnaire submission on 11/15/2024 .    Total Time (in minutes): 14     [facility] Peq Evisit Supergroup-Muscle,Back,Joint    11/15/2024  8:23 AM CST - Filed by Patient   What do you need help with? Neck Problem   Do you agree to participate in an E-Visit? Yes   If you have any of the following symptoms, please present to your local emergency room or call 911:  I acknowledge   Select all that apply: None of the above   What is the main issue you would like addressed today? I fell off my horse yesterday and my neck and head hurt.   Do you have any of the following: New or worsening joint pain   Provide any additional information you feel is important.    Please attach any relevant images or files    Are you able to take your vital signs? No   Do you have a history of any of the following: Tendonitis   What joint(s) are you having a problem with? Shoulder   Describe the exact location of the pain. Neck   What activites make your joint pain worse? Stretching;  Other   Describe any activities that make your joint pain worse. Turning my head and moving too much   What have you used to help with your joint pain? Anti-inflammatory (ibuprofen, Aleve, Advil);  Heat;  Stretching the joint   Has there been any change in your joint pain based on the treatment you have tried? Unchanged   Do you have any history of joint injuries, surgeries, or other medical conditions that may be related to your joint pain? No          Active Problem List with Overview Notes    Diagnosis Date Noted    Mother currently breast-feeding 06/30/2023    HPV (human papilloma virus)  anogenital infection 06/30/2023      Recent Labs Obtained:  Lab Results   Component Value Date    WBC 5.58 11/13/2024    HGB 13.8 11/13/2024    HCT 43.5 11/13/2024    MCV 86 11/13/2024     11/13/2024     11/13/2024    K 4.4 11/13/2024    GLU 84 11/13/2024    CREATININE 0.8 11/13/2024    EGFRNORACEVR >60.0 11/13/2024    HGBA1C 5.4 07/03/2023    TSH 0.657 07/03/2023      Review of patient's allergies indicates:  No Known Allergies    Encounter Diagnoses   Name Primary?    Neck pain Yes    Shoulder pain, unspecified chronicity, unspecified laterality         Orders Placed This Encounter   Procedures    X-Ray Cervical Spine Complete 5 view     Standing Status:   Future     Standing Expiration Date:   11/15/2025     Order Specific Question:   May the Radiologist modify the order per protocol to meet the clinical needs of the patient?     Answer:   Yes     Order Specific Question:   Release to patient     Answer:   Immediate    X-Ray Shoulder Complete Bilateral     Standing Status:   Future     Standing Expiration Date:   11/15/2025     Order Specific Question:   May the Radiologist modify the order per protocol to meet the clinical needs of the patient?     Answer:   Yes     Order Specific Question:   Release to patient     Answer:   Immediate      Medications Ordered This Encounter   Medications    meloxicam (MOBIC) 15 MG tablet     Sig: Take 1 tablet (15 mg total) by mouth daily as needed for Pain.     Dispense:  30 tablet     Refill:  1    methylPREDNISolone (MEDROL DOSEPACK) 4 mg tablet     Sig: follow package directions     Dispense:  21 tablet     Refill:  0    tiZANidine (ZANAFLEX) 2 MG tablet     Sig: Take 1 tablet (2 mg total) by mouth every 8 (eight) hours as needed (muscle spasms).     Dispense:  60 tablet     Refill:  1        E-Visit Time Tracking:    Day 1 Time (in minutes): 14    Total Time (in minutes): 14      274}

## 2024-12-10 ENCOUNTER — E-VISIT (OUTPATIENT)
Dept: FAMILY MEDICINE | Facility: CLINIC | Age: 34
End: 2024-12-10
Payer: COMMERCIAL

## 2024-12-10 DIAGNOSIS — B00.1 COLD SORE: Primary | ICD-10-CM

## 2024-12-10 RX ORDER — VALACYCLOVIR HYDROCHLORIDE 1 G/1
1000 TABLET, FILM COATED ORAL 2 TIMES DAILY
Qty: 10 TABLET | Refills: 0 | Status: SHIPPED | OUTPATIENT
Start: 2024-12-10 | End: 2024-12-15

## 2024-12-10 NOTE — PROGRESS NOTES
Patient ID: Lanie Arce is a 33 y.o. female.    Chief Complaint: General Illness (Entered automatically based on patient selection in [employer].)    The patient initiated a request through [employer] on 12/10/2024 for evaluation and management with a chief complaint of General Illness (Entered automatically based on patient selection in [employer].)     I evaluated the questionnaire submission on 12/10/2024.    Ohs Peq Evisit Supergroup-Common Problems    12/10/2024  7:09 AM CST - Filed by Patient   What do you need help with? Rash   Do you agree to participate in an E-Visit? Yes   If you have any of the following symptoms, please present to your local emergency room or call 911:  I acknowledge   Do you have any of the following pregnancy-related conditions? None   What is the main issue you would like addressed today? I have coldsores taking over my mouth/face.   How would you describe your skin problem? Lump or bump   When did your symptoms first appear? 12/6/2024   Where is it located?  Face;  Other   Does it itch? No   Does it hurt? Yes   Where is the pain located? Where the skin change is noted   The pain came on: Suddenly   The pain has the character of: Burning   Frequency of the pain (How often does it appear)? It is always there   Please select the face that most closely captures your pain level: 2   Is there discharge or drainage? No   Is there bleeding? No   Describe the character Blistered   Describe the color Red   Has it changed over time? Spread to other locations   Frequency of skin problem Fluctuates at random   Duration of the skin problem (how long does it stay when it is present) Weeks   I have had a new exposure to No new exposures   I have had a new exposure to No new exposures   What have you used to treat the skin problem? Nothing so far   If you have used anything for treatment, has it helped the symptoms? No   Other generalized symptoms that you associate with the rash Fever   Provide any  additional information you feel is important. I had SI joint fusion friday. Since the swelling and spreading of these blisters has gotten worse.   At least one photo is required for treatment to be provided. You can upload a maximum of three photos of the affected area.     Are you able to take your vital signs? No         Encounter Diagnosis   Name Primary?    Cold sore Yes        No orders of the defined types were placed in this encounter.     Medications Ordered This Encounter   Medications    valACYclovir (VALTREX) 1000 MG tablet     Sig: Take 1 tablet (1,000 mg total) by mouth 2 (two) times daily. for 5 days     Dispense:  10 tablet     Refill:  0        No follow-ups on file.      E-Visit Time Tracking:    Day 1 Time (in minutes): 5    Total Time (in minutes): 5

## 2025-02-26 ENCOUNTER — OFFICE VISIT (OUTPATIENT)
Dept: INTERNAL MEDICINE | Facility: CLINIC | Age: 35
End: 2025-02-26
Payer: COMMERCIAL

## 2025-02-26 ENCOUNTER — LAB VISIT (OUTPATIENT)
Dept: LAB | Facility: HOSPITAL | Age: 35
End: 2025-02-26
Attending: FAMILY MEDICINE
Payer: COMMERCIAL

## 2025-02-26 VITALS
DIASTOLIC BLOOD PRESSURE: 70 MMHG | TEMPERATURE: 98 F | BODY MASS INDEX: 28.81 KG/M2 | WEIGHT: 179.25 LBS | OXYGEN SATURATION: 98 % | SYSTOLIC BLOOD PRESSURE: 110 MMHG | HEIGHT: 66 IN | HEART RATE: 93 BPM

## 2025-02-26 DIAGNOSIS — Z00.00 ROUTINE GENERAL MEDICAL EXAMINATION AT A HEALTH CARE FACILITY: Primary | ICD-10-CM

## 2025-02-26 DIAGNOSIS — E61.1 IRON DEFICIENCY: ICD-10-CM

## 2025-02-26 DIAGNOSIS — Z00.00 ROUTINE GENERAL MEDICAL EXAMINATION AT A HEALTH CARE FACILITY: ICD-10-CM

## 2025-02-26 DIAGNOSIS — K58.0 IRRITABLE BOWEL SYNDROME WITH DIARRHEA: ICD-10-CM

## 2025-02-26 DIAGNOSIS — M53.3 SI (SACROILIAC) JOINT DYSFUNCTION: ICD-10-CM

## 2025-02-26 LAB
ALBUMIN SERPL BCP-MCNC: 3.8 G/DL (ref 3.5–5.2)
ALP SERPL-CCNC: 83 U/L (ref 40–150)
ALT SERPL W/O P-5'-P-CCNC: 17 U/L (ref 10–44)
ANION GAP SERPL CALC-SCNC: 7 MMOL/L (ref 8–16)
APTT PPP: 28.2 SEC (ref 21–32)
AST SERPL-CCNC: 45 U/L (ref 10–40)
BASOPHILS # BLD AUTO: 0.05 K/UL (ref 0–0.2)
BASOPHILS NFR BLD: 0.8 % (ref 0–1.9)
BILIRUB SERPL-MCNC: 0.3 MG/DL (ref 0.1–1)
BILIRUB UR QL STRIP: NEGATIVE
BUN SERPL-MCNC: 14 MG/DL (ref 6–20)
CALCIUM SERPL-MCNC: 9.3 MG/DL (ref 8.7–10.5)
CHLORIDE SERPL-SCNC: 109 MMOL/L (ref 95–110)
CHOLEST SERPL-MCNC: 156 MG/DL (ref 120–199)
CHOLEST/HDLC SERPL: 3.6 {RATIO} (ref 2–5)
CLARITY UR REFRACT.AUTO: CLEAR
CO2 SERPL-SCNC: 23 MMOL/L (ref 23–29)
COLOR UR AUTO: COLORLESS
CREAT SERPL-MCNC: 0.7 MG/DL (ref 0.5–1.4)
DIFFERENTIAL METHOD BLD: ABNORMAL
EOSINOPHIL # BLD AUTO: 0.3 K/UL (ref 0–0.5)
EOSINOPHIL NFR BLD: 5.1 % (ref 0–8)
ERYTHROCYTE [DISTWIDTH] IN BLOOD BY AUTOMATED COUNT: 13.8 % (ref 11.5–14.5)
EST. GFR  (NO RACE VARIABLE): >60 ML/MIN/1.73 M^2
ESTIMATED AVG GLUCOSE: 103 MG/DL (ref 68–131)
FERRITIN SERPL-MCNC: 130 NG/ML (ref 20–300)
GLUCOSE SERPL-MCNC: 82 MG/DL (ref 70–110)
GLUCOSE UR QL STRIP: NEGATIVE
HBA1C MFR BLD: 5.2 % (ref 4–5.6)
HCT VFR BLD AUTO: 39.4 % (ref 37–48.5)
HDLC SERPL-MCNC: 43 MG/DL (ref 40–75)
HDLC SERPL: 27.6 % (ref 20–50)
HGB BLD-MCNC: 12.5 G/DL (ref 12–16)
HGB UR QL STRIP: ABNORMAL
IMM GRANULOCYTES # BLD AUTO: 0.02 K/UL (ref 0–0.04)
IMM GRANULOCYTES NFR BLD AUTO: 0.3 % (ref 0–0.5)
INR PPP: 1 (ref 0.8–1.2)
IRON SERPL-MCNC: 55 UG/DL (ref 30–160)
KETONES UR QL STRIP: NEGATIVE
LDLC SERPL CALC-MCNC: 90 MG/DL (ref 63–159)
LEUKOCYTE ESTERASE UR QL STRIP: NEGATIVE
LYMPHOCYTES # BLD AUTO: 2.2 K/UL (ref 1–4.8)
LYMPHOCYTES NFR BLD: 35.8 % (ref 18–48)
MCH RBC QN AUTO: 27.4 PG (ref 27–31)
MCHC RBC AUTO-ENTMCNC: 31.7 G/DL (ref 32–36)
MCV RBC AUTO: 86 FL (ref 82–98)
MONOCYTES # BLD AUTO: 0.4 K/UL (ref 0.3–1)
MONOCYTES NFR BLD: 6.7 % (ref 4–15)
NEUTROPHILS # BLD AUTO: 3.1 K/UL (ref 1.8–7.7)
NEUTROPHILS NFR BLD: 51.3 % (ref 38–73)
NITRITE UR QL STRIP: NEGATIVE
NONHDLC SERPL-MCNC: 113 MG/DL
NRBC BLD-RTO: 0 /100 WBC
PH UR STRIP: 6 [PH] (ref 5–8)
PLATELET # BLD AUTO: 219 K/UL (ref 150–450)
PMV BLD AUTO: 11.9 FL (ref 9.2–12.9)
POTASSIUM SERPL-SCNC: 4.4 MMOL/L (ref 3.5–5.1)
PROT SERPL-MCNC: 6.8 G/DL (ref 6–8.4)
PROT UR QL STRIP: NEGATIVE
PROTHROMBIN TIME: 10.9 SEC (ref 9–12.5)
RBC # BLD AUTO: 4.56 M/UL (ref 4–5.4)
SATURATED IRON: 17 % (ref 20–50)
SODIUM SERPL-SCNC: 139 MMOL/L (ref 136–145)
SP GR UR STRIP: 1 (ref 1–1.03)
T4 FREE SERPL-MCNC: 0.98 NG/DL (ref 0.71–1.51)
TOTAL IRON BINDING CAPACITY: 324 UG/DL (ref 250–450)
TRANSFERRIN SERPL-MCNC: 219 MG/DL (ref 200–375)
TRIGL SERPL-MCNC: 115 MG/DL (ref 30–150)
TSH SERPL DL<=0.005 MIU/L-ACNC: 0.34 UIU/ML (ref 0.4–4)
URN SPEC COLLECT METH UR: ABNORMAL
WBC # BLD AUTO: 6.12 K/UL (ref 3.9–12.7)

## 2025-02-26 PROCEDURE — 84439 ASSAY OF FREE THYROXINE: CPT | Performed by: FAMILY MEDICINE

## 2025-02-26 PROCEDURE — 84466 ASSAY OF TRANSFERRIN: CPT | Performed by: FAMILY MEDICINE

## 2025-02-26 PROCEDURE — 85610 PROTHROMBIN TIME: CPT | Performed by: FAMILY MEDICINE

## 2025-02-26 PROCEDURE — 80053 COMPREHEN METABOLIC PANEL: CPT | Performed by: FAMILY MEDICINE

## 2025-02-26 PROCEDURE — 84443 ASSAY THYROID STIM HORMONE: CPT | Performed by: FAMILY MEDICINE

## 2025-02-26 PROCEDURE — 85730 THROMBOPLASTIN TIME PARTIAL: CPT | Performed by: FAMILY MEDICINE

## 2025-02-26 PROCEDURE — 82728 ASSAY OF FERRITIN: CPT | Performed by: FAMILY MEDICINE

## 2025-02-26 PROCEDURE — 99999 PR PBB SHADOW E&M-EST. PATIENT-LVL III: CPT | Mod: PBBFAC,,, | Performed by: FAMILY MEDICINE

## 2025-02-26 PROCEDURE — 85025 COMPLETE CBC W/AUTO DIFF WBC: CPT | Performed by: FAMILY MEDICINE

## 2025-02-26 PROCEDURE — 80061 LIPID PANEL: CPT | Performed by: FAMILY MEDICINE

## 2025-02-26 PROCEDURE — 82652 VIT D 1 25-DIHYDROXY: CPT | Performed by: FAMILY MEDICINE

## 2025-02-26 PROCEDURE — 81003 URINALYSIS AUTO W/O SCOPE: CPT | Performed by: FAMILY MEDICINE

## 2025-02-26 PROCEDURE — 83036 HEMOGLOBIN GLYCOSYLATED A1C: CPT | Performed by: FAMILY MEDICINE

## 2025-02-26 RX ORDER — TIRZEPATIDE 10 MG/.5ML
10 INJECTION, SOLUTION SUBCUTANEOUS
COMMUNITY
Start: 2025-02-21 | End: 2025-05-22

## 2025-02-26 NOTE — PROGRESS NOTES
Subjective     Patient ID: Lanie Arce is a 34 y.o. female.    Chief Complaint: Establish Care    History of Present Illness    Lanie presents for an annual wellness visit and to discuss potential pre-operative clearance for an upcoming SI joint fusion surgery.      Lanie reports ongoing severe SI joint pain for an extended period. She had a lower lumbar fusion in the past, which resolved one issue but led to SI joint exacerbation. Lanie has a history of 4 childbirths, which may have contributed to her condition.    Lanie is scheduled to see her back doctor on Friday, where they will likely schedule an SI joint fusion. She had a previous surgery in December, for which she required pre-operative clearance including labs and an EKG.    Lanie reports ongoing bowel issues, with normal bowel movements ranging from soft to liquid consistency. She denies significant stomach cramping associated with these symptoms. The duration of these bowel issues is not specified, but patient describes it as her typical bowel pattern.    Lanie mentions a recent mammogram and ultrasound due to nipple discharge. She had brown fluid leaking from her breast, which has now changed to a creamy discharge. The imaging revealed enlarged ducts, but the cause is unknown. A prolactin level was checked and found to be normal. She reports no current breast pain, only extra sensitivity around her menstrual period.    Lanie notes changes in her menstrual cycle following a tubal ligation performed during her last  in 2022. Her periods remain timely but now last longer, starting for 2-3 days, stopping, then resuming with a little more bleeding before stopping again.    Lanie reports feeling fatigued but attributes it to having numerous responsibilities in her life. She has a history of anemia and vitamin D deficiency, as previously diagnosed by her doctor. Lanie denies chest pain and shortness of breath. She denies having been diagnosed  with IBS.    MEDICATIONS:  Lanie is on protein bars and multivitamin gummies.    MEDICAL HISTORY:  Lanie has a history of iron deficiency anemia and vitamin D deficiency.    SURGICAL HISTORY:  She has undergone a lower lumbar fusion. On 2022, the patient had a  along with a tubal ligation. An SI joint fusion is planned.    TEST RESULTS:  She underwent a CBC and urinalysis in  last year. Lanie has a past history of anemia, and her vitamin D level was reported as low. Her prolactin level was normal. Lanie had an EKG in  last year.    IMAGING:  She had a chest XR in  last year. A mammogram showed enlarged ducts and extremely dense breast tissue. A breast ultrasound confirmed the enlarged ducts.    SOCIAL HISTORY:  Occupation: [employer] children      ROS:  General: -fever, -chills, +fatigue, -weight gain, -weight loss  Eyes: -vision changes, -redness, -discharge  ENT: -ear pain, -nasal congestion, -sore throat  Cardiovascular: -chest pain, -palpitations, -lower extremity edema  Respiratory: -cough, -shortness of breath  Gastrointestinal: -abdominal pain, -nausea, -vomiting, -diarrhea, -constipation, -blood in stool, +change in bowel habits  Genitourinary: -dysuria, -hematuria, -frequency  Musculoskeletal: +joint pain, -muscle pain  Skin: -rash, -lesion  Neurological: -headache, -dizziness, -numbness, -tingling  Psychiatric: -anxiety, -depression, -sleep difficulty  Breasts: +nipple discharge            Objective     Physical Exam           Assessment and Plan     1. Routine general medical examination at a health care facility  Comments:  reviewed age appropriate screenings and immunizations  Orders:  -     T4, Free; Future; Expected date: 2025  -     TSH; Future; Expected date: 2025  -     Hemoglobin A1C; Future; Expected date: 2025  -     Lipid Panel; Future; Expected date: 2025  -     Comprehensive Metabolic Panel; Future; Expected date:  02/26/2025  -     CBC Auto Differential; Future; Expected date: 02/26/2025  -     Calcitriol; Future; Expected date: 02/26/2025  -     APTT; Future; Expected date: 02/26/2025  -     Protime-INR; Future; Expected date: 02/26/2025  -     Urinalysis, Reflex to Urine Culture Urine, Clean Catch    2. Iron deficiency  -     Iron and TIBC; Future; Expected date: 03/12/2025  -     Ferritin; Future; Expected date: 03/12/2025        Assessment & Plan    Assessed patient for pre-op clearance for potential SI joint fusion surgery  Reviewed history of lower lumbar fusion and ongoing SI joint pain  Considered history of anemia and vitamin D deficiency  Evaluated reported chronic diarrhea, suspecting possible IBS  Discussed flu vaccine, determining risk level based on homeschooling situation  Assessed recent mammogram and ultrasound results showing enlarged ducts, with plan to recheck in 6 months  Considered impact of tubal ligation on menstrual cycle irregularities    PATIENT EDUCATION:   Explained potential electrolyte loss due to chronic diarrhea   Discussed link between tubal ligation and menstrual cycle changes   Informed about OTC flu treatment options    ACTION ITEMS/LIFESTYLE:   Increase water intake to 3-4 32oz cups per day   Add electrolytes to diet to compensate for diarrhea-related losses   Consider taking multivitamin gummies to address potential nutrient deficiencies    ORDERS:   CBC (Complete Blood Count) ordered   Urinalysis ordered   Comprehensive metabolic panel (kidney, liver, sodium, potassium) ordered   Cholesterol check ordered   Diabetes screening ordered   Thyroid function test ordered   Vitamin D level ordered   Iron level ordered    FOLLOW UP:   Follow up in 1 year for annual check-up   Contact office with pre-op clearance paperwork from back doctor when received   Contact office if flu symptoms develop for potential treatment              Follow up in about 1 year (around 2/26/2026) for  Annual Exam.    This note was generated with the assistance of ambient listening technology. Verbal consent was obtained by the patient and accompanying visitor(s) for the recording of patient appointment to facilitate this note. I attest to having reviewed and edited the generated note for accuracy, though some syntax or spelling errors may persist. Please contact the author of this note for any clarification.

## 2025-02-28 ENCOUNTER — PATIENT MESSAGE (OUTPATIENT)
Dept: INTERNAL MEDICINE | Facility: CLINIC | Age: 35
End: 2025-02-28
Payer: COMMERCIAL

## 2025-02-28 LAB — 1,25(OH)2D3 SERPL-MCNC: 54 PG/ML (ref 20–79)

## 2025-03-05 ENCOUNTER — RESULTS FOLLOW-UP (OUTPATIENT)
Dept: INTERNAL MEDICINE | Facility: CLINIC | Age: 35
End: 2025-03-05

## 2025-03-11 ENCOUNTER — PATIENT MESSAGE (OUTPATIENT)
Dept: INTERNAL MEDICINE | Facility: CLINIC | Age: 35
End: 2025-03-11
Payer: COMMERCIAL

## 2025-07-24 RX ORDER — IRON,CARBONYL/ASCORBIC ACID 100-250 MG
1 TABLET ORAL DAILY
Qty: 30 TABLET | Refills: 3 | Status: SHIPPED | OUTPATIENT
Start: 2025-07-24